# Patient Record
Sex: MALE | Race: BLACK OR AFRICAN AMERICAN | NOT HISPANIC OR LATINO | URBAN - METROPOLITAN AREA
[De-identification: names, ages, dates, MRNs, and addresses within clinical notes are randomized per-mention and may not be internally consistent; named-entity substitution may affect disease eponyms.]

---

## 2021-08-21 ENCOUNTER — EMERGENCY (EMERGENCY)
Facility: HOSPITAL | Age: 51
LOS: 1 days | Discharge: HOME | End: 2021-08-21
Admitting: EMERGENCY MEDICINE
Payer: MEDICAID

## 2021-08-21 VITALS
HEART RATE: 110 BPM | TEMPERATURE: 98 F | DIASTOLIC BLOOD PRESSURE: 66 MMHG | WEIGHT: 179.9 LBS | SYSTOLIC BLOOD PRESSURE: 119 MMHG | OXYGEN SATURATION: 98 % | HEIGHT: 68 IN | RESPIRATION RATE: 18 BRPM

## 2021-08-21 DIAGNOSIS — L03.011 CELLULITIS OF RIGHT FINGER: ICD-10-CM

## 2021-08-21 DIAGNOSIS — R22.31 LOCALIZED SWELLING, MASS AND LUMP, RIGHT UPPER LIMB: ICD-10-CM

## 2021-08-21 DIAGNOSIS — B20 HUMAN IMMUNODEFICIENCY VIRUS [HIV] DISEASE: ICD-10-CM

## 2021-08-21 PROCEDURE — 73140 X-RAY EXAM OF FINGER(S): CPT | Mod: 26,RT

## 2021-08-21 PROCEDURE — 99284 EMERGENCY DEPT VISIT MOD MDM: CPT | Mod: 25

## 2021-08-21 PROCEDURE — 73120 X-RAY EXAM OF HAND: CPT | Mod: 26,RT

## 2021-08-21 RX ORDER — IBUPROFEN 200 MG
1 TABLET ORAL
Qty: 28 | Refills: 0
Start: 2021-08-21

## 2021-08-21 RX ADMIN — Medication 100 MILLIGRAM(S): at 10:46

## 2021-08-21 NOTE — ED PROVIDER NOTE - PATIENT PORTAL LINK FT
You can access the FollowMyHealth Patient Portal offered by [facility] by registering at the following website: [email or web address]. By joining [employer]’s FollowMyHealth portal, you will also be able to view your health information using other applications (apps) compatible with our system.

## 2021-08-21 NOTE — ED PROVIDER NOTE - CLINICAL SUMMARY MEDICAL DECISION MAKING FREE TEXT BOX
right 4th digit paronychia with rebeka, I&D by hand on call Dr. Jackson with lots of pus, given one dose of IV clinda in ED, rx clindamycin, return to ED in 48 hours for wound check. patient agrees with plan

## 2021-08-21 NOTE — ED PROVIDER NOTE - OBJECTIVE STATEMENT
50 yo M w/ PMHx of HIV (states he hasn't taken any medications in the past 3 days; questionable compliance) presents to the ED c/o R 4th fingertip swelling and pain x 1 week. Pt denies trauma or fever; no history of similar in the past. 52 yo M w/ PMHx of HIV, questionable compliance presents to the ED c/o R 4th fingertip swelling and pain x 1 week. Pt denies trauma or fever; no history of similar in the past. denies picking at the skin or biting nails.

## 2021-08-21 NOTE — ED PROVIDER NOTE - MUSCULOSKELETAL, MLM
RUE: paronychia with felon to R 4th digit; rest of hand nontender. negative Kanavel sign, able to make a fist.

## 2021-08-21 NOTE — ED PROVIDER NOTE - NSFOLLOWUPINSTRUCTIONS_ED_ALL_ED_FT
Take antibiotics as prescribed  Keep wound clean and dry    Return in 2 days to the Emergency Department for wound check    Return for worsening or new symptoms.

## 2021-08-23 LAB
-  AMIKACIN: SIGNIFICANT CHANGE UP
-  AMOXICILLIN/CLAVULANIC ACID: SIGNIFICANT CHANGE UP
-  AMPICILLIN/SULBACTAM: SIGNIFICANT CHANGE UP
-  AMPICILLIN: SIGNIFICANT CHANGE UP
-  AZTREONAM: SIGNIFICANT CHANGE UP
-  CEFAZOLIN: SIGNIFICANT CHANGE UP
-  CEFEPIME: SIGNIFICANT CHANGE UP
-  CEFOXITIN: SIGNIFICANT CHANGE UP
-  CEFTRIAXONE: SIGNIFICANT CHANGE UP
-  CIPROFLOXACIN: SIGNIFICANT CHANGE UP
-  ERTAPENEM: SIGNIFICANT CHANGE UP
-  GENTAMICIN: SIGNIFICANT CHANGE UP
-  IMIPENEM: SIGNIFICANT CHANGE UP
-  LEVOFLOXACIN: SIGNIFICANT CHANGE UP
-  MEROPENEM: SIGNIFICANT CHANGE UP
-  PIPERACILLIN/TAZOBACTAM: SIGNIFICANT CHANGE UP
-  TOBRAMYCIN: SIGNIFICANT CHANGE UP
-  TRIMETHOPRIM/SULFAMETHOXAZOLE: SIGNIFICANT CHANGE UP
CULTURE RESULTS: SIGNIFICANT CHANGE UP
METHOD TYPE: SIGNIFICANT CHANGE UP
ORGANISM # SPEC MICROSCOPIC CNT: SIGNIFICANT CHANGE UP
ORGANISM # SPEC MICROSCOPIC CNT: SIGNIFICANT CHANGE UP
SPECIMEN SOURCE: SIGNIFICANT CHANGE UP

## 2021-08-25 ENCOUNTER — EMERGENCY (EMERGENCY)
Facility: HOSPITAL | Age: 51
LOS: 1 days | Discharge: HOME | End: 2021-08-25
Admitting: EMERGENCY MEDICINE
Payer: MEDICAID

## 2021-08-25 VITALS
WEIGHT: 179.9 LBS | DIASTOLIC BLOOD PRESSURE: 81 MMHG | RESPIRATION RATE: 16 BRPM | SYSTOLIC BLOOD PRESSURE: 125 MMHG | HEIGHT: 68 IN | OXYGEN SATURATION: 98 % | HEART RATE: 92 BPM | TEMPERATURE: 99 F

## 2021-08-25 DIAGNOSIS — Z21 ASYMPTOMATIC HUMAN IMMUNODEFICIENCY VIRUS [HIV] INFECTION STATUS: ICD-10-CM

## 2021-08-25 DIAGNOSIS — Z48.00 ENCOUNTER FOR CHANGE OR REMOVAL OF NONSURGICAL WOUND DRESSING: ICD-10-CM

## 2021-08-25 DIAGNOSIS — L03.011 CELLULITIS OF RIGHT FINGER: ICD-10-CM

## 2021-08-25 PROCEDURE — 99283 EMERGENCY DEPT VISIT LOW MDM: CPT

## 2021-08-25 NOTE — ED PROVIDER NOTE - CLINICAL SUMMARY MEDICAL DECISION MAKING FREE TEXT BOX
pt presents for wound check of right 4th digit. pt had paronychia drained 4 days ago and has not returned for wound check or packing removal and has not been taking antibiotics as prescribed. packing removed, wound irrigated with betadine and saline. xeroform dressing applied. strict instructions on antibiotics and return for wound check in 48 hours.

## 2021-08-25 NOTE — ED PROVIDER NOTE - PHYSICAL EXAMINATION
Constitutional: Well appearing, awake, alert, oriented to person, place, time/situation and in no apparent distress.  HEENT: Airway patent, NCAT  Resp: no conversational dyspnea, tachypnea, or signs of respiratory distress  Msk: ambulating with normal steady gait  Neuro: A&Ox3   Skin: right 4th digit with packing in place and 2cm of purulent blister superficial with active drainage. I&D extends up to nail bed. cap refill < 2 seconds

## 2021-08-25 NOTE — ED PROVIDER NOTE - OBJECTIVE STATEMENT
50yo M with h/o HIV with unclear compliance presents today for wound check for right 4th digit which was drained 4 days ago. pt was d/c on clinda but reports he was only taking it 1-2 times per day and didn't know he was supposed to take it 3 times per day. pt was supposed to come back in two days for packing removal but he did not. he also notes in that time he was washing dishes and his dressing got wet and he slept in the wet dressing. denies fever, chills, numbness, weakness, any other concerns.

## 2021-08-25 NOTE — ED PROVIDER NOTE - NSFOLLOWUPINSTRUCTIONS_ED_ALL_ED_FT
TAKE ANTIBIOTICS THREE TIMES A DAY EVERY DAY AS PRESCRIBED.     DO NOT ALLOW YOUR FINGER TO GET WET.     RETURN TO ER FOR WOUND CHECK IN 2 DAYS.     Paronychia    WHAT YOU NEED TO KNOW:    What is paronychia? Paronychia is an infection of your nail fold caused by bacteria or a fungus. The nail fold is the skin around your nail. Paronychia may happen suddenly and last for 6 weeks or longer. You may have paronychia on more than 1 finger or toe.    What increases my risk for paronychia?   •Trauma: Any injury that causes your skin to tear can lead to infection. Your risk is increased if you have ingrown nails, bite your nails, or wear acrylic nails.      •Frequent contact with water: Jobs that require you to soak your hands in water often may increase your risk for paronychia. Common examples are nurses, cooks, and . Swimmers also have increased risk.      •Medical conditions: Diabetes and other conditions that cause a weak immune system can increase your risk. Some examples are skin cancer, psoriasis, HIV, and lupus.      •Chemicals: Contact with soaps, detergents, and other chemicals can cause inflammation and lead to paronychia.      •Allergies: Allergies to certain foods, nail polish, or latex can cause inflammation and increase your risk.      What are the signs and symptoms of paronychia?   •Red, hot, swollen, painful nail fold      •Pus coming out of your nail fold when you press on it      •Nail that pulls away from your nail fold and may fall off      •Changes in nail color, such as green nails      •Fever      •Thick, rough nail, or ridges in the nail      How is paronychia diagnosed? Your healthcare provider will examine your nails and ask about your symptoms. He may press on your infected nail to see if pus drains from it. He will send any pus to a lab for tests to learn what germ is causing your infection. This is called a fluid culture.     How is paronychia treated?   •Medicine:?Td vaccine is a booster shot used to help prevent tetanus and diphtheria. The Td booster may be given to adolescents and adults every 10 years or for certain wounds and injuries.      ?Antibiotics: This medicine will help fight or prevent an infection caused by bacteria. It may be given as a pill, cream, or ointment.      ?Steroids: This medicine will help decrease inflammation. It may be given as a pill, cream, or ointment.      ?Antifungal medicine: This medicine helps kill fungus that may be causing your infection. It may be given as a cream or ointment.      ?NSAIDs: These medicines decrease pain and swelling. NSAIDs are available without a doctor's order. Ask your healthcare provider which medicine is right for you. Ask how much to take and when to take it. Take as directed. NSAIDs can cause stomach bleeding and kidney problems if not taken correctly.      •Procedures: You may need surgery to drain an abscess (pus pocket) in your finger or toe. Your nail may need to be removed. Infected tissue around your nail may also need to be removed.      What are the risks of paronychia? Your nail may become loose, deformed, or fall off. The infection may form a large abscess on your nail. The infection may spread to nearby tissue and bone.     How can paronychia be prevented?   •Avoid chemicals and allergens that may harm your skin and nails. This includes soaps, laundry detergents, and nail products.      •Keep your nails clean and dry. Do not soak your nails in water. Use cotton-lined rubber gloves or wear 2 rubber gloves if you work with food or water. The gloves will help protect your nail folds.      •Keep your nails short. Do not bite your nails, pick at your hangnails, suck your fingers, or wear fake nails. Bring your own nail tools when you go to the nail salon.      How can I manage my symptoms?   •Soak your nail: Soak your nail in a mixture of equal parts vinegar and water 3 or 4 times each day. This will help decrease inflammation.      •Apply a warm compress: Soak a washcloth in warm water and place it on your nail. This will help decrease inflammation.       •Elevate: Raise your nail above the level of your heart as often as you can. This will help decrease swelling and pain. Prop your nail on pillows or blankets to keep it elevated comfortably.       •Use lotion: Apply lotion after you wash your hands. This will prevent the skin from becoming too dry.       When should I contact my healthcare provider?   •Your nail becomes loose, deformed, or falls off.      •You have a large abscess on your nail.      •You have questions or concerns about your condition or care.      When should I seek immediate care?   •You have severe nail pain.      Paronychia    WHAT YOU NEED TO KNOW:    What is paronychia? Paronychia is an infection of your nail fold caused by bacteria or a fungus. The nail fold is the skin around your nail. Paronychia may happen suddenly and last for 6 weeks or longer. You may have paronychia on more than 1 finger or toe.    What increases my risk for paronychia?   •Trauma: Any injury that causes your skin to tear can lead to infection. Your risk is increased if you have ingrown nails, bite your nails, or wear acrylic nails.      •Frequent contact with water: Jobs that require you to soak your hands in water often may increase your risk for paronychia. Common examples are nurses, cooks, and . Swimmers also have increased risk.      •Medical conditions: Diabetes and other conditions that cause a weak immune system can increase your risk. Some examples are skin cancer, psoriasis, HIV, and lupus.      •Chemicals: Contact with soaps, detergents, and other chemicals can cause inflammation and lead to paronychia.      •Allergies: Allergies to certain foods, nail polish, or latex can cause inflammation and increase your risk.      What are the signs and symptoms of paronychia?   •Red, hot, swollen, painful nail fold      •Pus coming out of your nail fold when you press on it      •Nail that pulls away from your nail fold and may fall off      •Changes in nail color, such as green nails      •Fever      •Thick, rough nail, or ridges in the nail      How is paronychia diagnosed? Your healthcare provider will examine your nails and ask about your symptoms. He may press on your infected nail to see if pus drains from it. He will send any pus to a lab for tests to learn what germ is causing your infection. This is called a fluid culture.     How is paronychia treated?   •Medicine:?Td vaccine is a booster shot used to help prevent tetanus and diphtheria. The Td booster may be given to adolescents and adults every 10 years or for certain wounds and injuries.      ?Antibiotics: This medicine will help fight or prevent an infection caused by bacteria. It may be given as a pill, cream, or ointment.      ?Steroids: This medicine will help decrease inflammation. It may be given as a pill, cream, or ointment.      ?Antifungal medicine: This medicine helps kill fungus that may be causing your infection. It may be given as a cream or ointment.      ?NSAIDs: These medicines decrease pain and swelling. NSAIDs are available without a doctor's order. Ask your healthcare provider which medicine is right for you. Ask how much to take and when to take it. Take as directed. NSAIDs can cause stomach bleeding and kidney problems if not taken correctly.      •Procedures: You may need surgery to drain an abscess (pus pocket) in your finger or toe. Your nail may need to be removed. Infected tissue around your nail may also need to be removed.      What are the risks of paronychia? Your nail may become loose, deformed, or fall off. The infection may form a large abscess on your nail. The infection may spread to nearby tissue and bone.     How can paronychia be prevented?   •Avoid chemicals and allergens that may harm your skin and nails. This includes soaps, laundry detergents, and nail products.      •Keep your nails clean and dry. Do not soak your nails in water. Use cotton-lined rubber gloves or wear 2 rubber gloves if you work with food or water. The gloves will help protect your nail folds.      •Keep your nails short. Do not bite your nails, pick at your hangnails, suck your fingers, or wear fake nails. Bring your own nail tools when you go to the nail salon.      How can I manage my symptoms?   •Soak your nail: Soak your nail in a mixture of equal parts vinegar and water 3 or 4 times each day. This will help decrease inflammation.      •Apply a warm compress: Soak a washcloth in warm water and place it on your nail. This will help decrease inflammation.       •Elevate: Raise your nail above the level of your heart as often as you can. This will help decrease swelling and pain. Prop your nail on pillows or blankets to keep it elevated comfortably.       •Use lotion: Apply lotion after you wash your hands. This will prevent the skin from becoming too dry.       When should I contact my healthcare provider?   •Your nail becomes loose, deformed, or falls off.      •You have a large abscess on your nail.      •You have questions or concerns about your condition or care.      When should I seek immediate care?   •You have severe nail pain.      •The inflammation spreads to your hand or arm.      CARE AGREEMENT:    You have the right to help plan your care. Learn about your health condition and how it may be treated. Discuss treatment options with your healthcare providers to decide what care you want to receive. You always have the right to refuse treatment.       •The inflammation spreads to your hand or arm.      CARE AGREEMENT:    You have the right to help plan your care. Learn about your health condition and how it may be treated. Discuss treatment options with your healthcare providers to decide what care you want to receive. You always have the right to refuse treatment.

## 2021-08-25 NOTE — ED PROVIDER NOTE - PATIENT PORTAL LINK FT
You can access the FollowMyHealth Patient Portal offered by [facility] by registering at the following website: [email or web address]. By joining [employer]’s FollowMyHealth portal, you will also be able to view your health information using other applications (apps) compatible with our system.

## 2021-08-26 PROBLEM — B20 HUMAN IMMUNODEFICIENCY VIRUS [HIV] DISEASE: Chronic | Status: ACTIVE | Noted: 2021-08-21

## 2022-12-20 ENCOUNTER — EMERGENCY (EMERGENCY)
Facility: HOSPITAL | Age: 52
LOS: 1 days | Discharge: HOME | End: 2022-12-20
Attending: EMERGENCY MEDICINE | Admitting: EMERGENCY MEDICINE

## 2022-12-20 VITALS
RESPIRATION RATE: 18 BRPM | SYSTOLIC BLOOD PRESSURE: 136 MMHG | HEART RATE: 108 BPM | WEIGHT: 179.9 LBS | TEMPERATURE: 98 F | OXYGEN SATURATION: 98 % | DIASTOLIC BLOOD PRESSURE: 91 MMHG

## 2022-12-20 VITALS
HEART RATE: 95 BPM | SYSTOLIC BLOOD PRESSURE: 140 MMHG | RESPIRATION RATE: 18 BRPM | OXYGEN SATURATION: 97 % | TEMPERATURE: 98 F | DIASTOLIC BLOOD PRESSURE: 90 MMHG

## 2022-12-20 DIAGNOSIS — N49.2 INFLAMMATORY DISORDERS OF SCROTUM: ICD-10-CM

## 2022-12-20 DIAGNOSIS — F41.8 OTHER SPECIFIED ANXIETY DISORDERS: ICD-10-CM

## 2022-12-20 DIAGNOSIS — R00.0 TACHYCARDIA, UNSPECIFIED: ICD-10-CM

## 2022-12-20 DIAGNOSIS — B20 HUMAN IMMUNODEFICIENCY VIRUS [HIV] DISEASE: ICD-10-CM

## 2022-12-20 PROCEDURE — 99283 EMERGENCY DEPT VISIT LOW MDM: CPT

## 2022-12-20 RX ORDER — CEPHALEXIN 500 MG
1 CAPSULE ORAL
Qty: 40 | Refills: 0
Start: 2022-12-20 | End: 2022-12-29

## 2022-12-20 RX ORDER — CEPHALEXIN 500 MG
500 CAPSULE ORAL ONCE
Refills: 0 | Status: COMPLETED | OUTPATIENT
Start: 2022-12-20 | End: 2022-12-20

## 2022-12-20 RX ADMIN — Medication 500 MILLIGRAM(S): at 18:11

## 2022-12-20 RX ADMIN — Medication 1 TABLET(S): at 18:11

## 2022-12-20 NOTE — ED PROVIDER NOTE - NSFOLLOWUPINSTRUCTIONS_ED_ALL_ED_FT
Please follow up with your primary care doctor in 3-4 days.   Do not shave your scrotal area.   Return to the ER if you develop any concerning symptoms.   Take you antibiotics as prescribed.     Abscess    An abscess is an infected area that contains a collection of pus and debris. It can occur in almost any part of the body and occurs when the tissue gets infection. Symptoms include a painful mass that is red, warm, tender that might break open and HAVE drainage. If your health care provider gave you antibiotics make sure to take the full course and do not stop even if feeling better.     SEEK IMMEDIATE MEDICAL CARE IF YOU HAVE ANY OF THE FOLLOWING SYMPTOMS: chills, fever, muscle aches, or red streaking from the area.    Cellulitis    Cellulitis is a skin infection caused by bacteria. This condition occurs most often in the arms and lower legs but can occur anywhere over the body. Symptoms include redness, swelling, warm skin, tenderness, and chills/fever. If you were prescribed an antibiotic medicine, take it as told by your health care provider. Do not stop taking the antibiotic even if you start to feel better.    SEEK IMMEDIATE MEDICAL CARE IF YOU HAVE ANY OF THE FOLLOWING SYMPTOMS: worsening fever, red streaks coming from affected area, vomiting or diarrhea, or dizziness/lightheadedness.

## 2022-12-20 NOTE — ED PROVIDER NOTE - CLINICAL SUMMARY MEDICAL DECISION MAKING FREE TEXT BOX
51 y/o M with PMHx of HIV (on medication, last vl undetectable 4-6 month ago), depression and anxiety (on psychiatric medication), no hx of DM, presents today stating "I think I have a staph infection". On exam, pt found to have 2 superficial abscesses on the scrotal area distal to the shaft of the penis draining purulent discharge. ED course: vitals signs noted. Pt is afebrile and mildly tachycardic at 108. Rest of vitals within normal limits. Pt will be given bactrim and keflex in the ED and also prescribed them. Advised pt not to shave the scrotal area. 53 y/o M with PMHx of HIV (on medication, last vl undetectable), depression and anxiety, no hx of DM, presents today stating "I think I have a staph infection". On exam, pt found to have 2 superficial abscesses on the scrotal sac which is draining purulent discharge with some mild cellulitis. ED course: vitals signs noted. Pt is afebrile and mildly tachycardic at 108. Rest of vitals within normal limits. VS normalized without any intervention. Moderate pus expressed from the 2 abscesses. Pt given Bactrim and Keflex in the ED and Rxs given. Advised not to shave his scrotal area. Non-toxic appearing and stable for discharge. To follow up outpatient. Strict return precautions given.

## 2022-12-20 NOTE — ED PROVIDER NOTE - CARE PROVIDERS DIRECT ADDRESSES
[email or web address]

## 2022-12-20 NOTE — ED PROVIDER NOTE - PATIENT PORTAL LINK FT
You can access the FollowMyHealth Patient Portal offered by [facility] by registering at the following website: [email or web address]. By joining [employer]’s FollowMyHealth portal, you will also be able to view your health information using other applications (apps) compatible with our system.

## 2022-12-20 NOTE — ED PROVIDER NOTE - PHYSICAL EXAMINATION
VITAL SIGNS: I have reviewed nursing notes and confirm.  CONSTITUTIONAL: Well-developed; well-nourished; in no acute distress.  SKIN: 2 superficial abscesses on the scrotal area distal to the shaft of the penis draining purulent discharge. Was able to express a moderate amount of purulent discharge from both abscesses with some mild surrounding erythema.   HEAD: Normocephalic; atraumatic.  EYES: Clear bilaterally.  ENT: Airway clear.  NECK: Supple; non tender.  CARD: Regular rate and rhythm.  RESP: No respiratory distress.  ABD: Soft; non-distended; non-tender.  MSK: Normal ROM. No clubbing, cyanosis or edema.  NEURO: Alert, oriented. Grossly unremarkable.  PSYCH: Cooperative, appropriate. VITAL SIGNS: I have reviewed nursing notes and confirm.  CONSTITUTIONAL: Well-developed; well-nourished; in no acute distress.  SKIN: 2 superficial abscesses noted on the scrotal sac draining purulent discharge. Was able to express a moderate amount of purulent discharge from both abscesses. Abscesses with some mild surrounding erythema.   HEAD: Normocephalic; atraumatic.  EYES: Clear bilaterally.  ENT: Airway clear.  NECK: Supple; non tender.  CARD: Regular rate and rhythm.  RESP: No respiratory distress.  ABD: Soft; non-distended; non-tender.  MSK: Normal ROM. No clubbing, cyanosis or edema.  NEURO: Alert, oriented. Grossly unremarkable.  PSYCH: Cooperative, appropriate.

## 2022-12-20 NOTE — ED PROVIDER NOTE - CARE PROVIDER_API CALL
Slim Corea)  Urology  [address] [place] [address]  Phone: [phone]  Fax: [phone]  Follow Up Time:     Pa Hanley  Urology  [address], [place] [address]  Phone: [phone]  Fax: [phone]  Follow Up Time:

## 2022-12-20 NOTE — ED PROVIDER NOTE - NSFOLLOWUPCLINICS_GEN_ALL_ED_FT
[facility]  Family Medicine  [address]  [place], [place] [address]  Phone: [phone]  Fax:   Follow Up Time: 4-6 Days

## 2022-12-20 NOTE — ED PROVIDER NOTE - OBJECTIVE STATEMENT
51 y/o M with PMHx of HIV (on medication, last vl undetectable 4-6 month ago), depression and anxiety (on psychiatric medication), no hx of DM, presents today stating "I think I have a staph infection". Pt states he shaves his scrotal area and noticed 2 bumps on it in the past week which have been draining purulent discharge over the past week. This is consistent with "staph infections in the past". Denies fever, chills, urinary symptoms, penile discharge. 53 y/o M with PMHx of HIV (on medication, last vl undetectable approx 4 month ago), depression and anxiety (on "psychiatric medication"), no hx of DM, presents today stating "I think I have a staph infection". Pt states he shaves his scrotal area and noticed 2 bumps on it in the past week which have been draining purulent discharge over the past week. This is consistent with "staph infections in the past". Denies fever, chills, urinary symptoms, penile discharge.

## 2024-05-09 NOTE — ED ADULT TRIAGE NOTE - HEIGHT IN FEET
Detail Level: Detailed Depth Of Biopsy: dermis Was A Bandage Applied: Yes Size Of Lesion In Cm: 0.4 X Size Of Lesion In Cm: 0 Biopsy Type: H and E Biopsy Method: Dermablade Anesthesia Type: 0.5% lidocaine without epinephrine Anesthesia Volume In Cc: 0.5 Hemostasis: Drysol Wound Care: Petrolatum Dressing: bandage Destruction After The Procedure: No 5 Type Of Destruction Used: Curettage Curettage Text: The wound bed was treated with curettage after the biopsy was performed. Cryotherapy Text: The wound bed was treated with cryotherapy after the biopsy was performed. Electrodesiccation Text: The wound bed was treated with electrodesiccation after the biopsy was performed. Electrodesiccation And Curettage Text: The wound bed was treated with electrodesiccation and curettage after the biopsy was performed. Silver Nitrate Text: The wound bed was treated with silver nitrate after the biopsy was performed. Lab: [id number] Lab Facility:  Consent: Written consent was obtained and risks were reviewed including but not limited to scarring, infection, bleeding, scabbing, incomplete removal, nerve damage and allergy to anesthesia. Post-Care Instructions: I reviewed with the patient in detail post-care instructions. Patient is to keep the biopsy site dry overnight, and then apply bacitracin twice daily until healed. Patient may apply hydrogen peroxide soaks to remove any crusting. Notification Instructions: Patient will be notified of biopsy results. However, patient instructed to call the office if not contacted within 2 weeks. Billing Type: Third-Party Bill Information: Selecting Yes will display possible errors in your note based on the variables you have selected. This validation is only offered as a suggestion for you. PLEASE NOTE THAT THE VALIDATION TEXT WILL BE REMOVED WHEN YOU FINALIZE YOUR NOTE. IF YOU WANT TO FAX A PRELIMINARY NOTE YOU WILL NEED TO TOGGLE THIS TO 'NO' IF YOU DO NOT WANT IT IN YOUR FAXED NOTE.